# Patient Record
Sex: MALE | Race: WHITE | URBAN - METROPOLITAN AREA
[De-identification: names, ages, dates, MRNs, and addresses within clinical notes are randomized per-mention and may not be internally consistent; named-entity substitution may affect disease eponyms.]

---

## 2018-08-27 ENCOUNTER — OFFICE VISIT (OUTPATIENT)
Dept: PODIATRY | Facility: CLINIC | Age: 38
End: 2018-08-27
Payer: MEDICARE

## 2018-08-27 VITALS — WEIGHT: 211 LBS | BODY MASS INDEX: 41.43 KG/M2 | HEIGHT: 60 IN | HEART RATE: 84 BPM

## 2018-08-27 DIAGNOSIS — B35.1 ONYCHOMYCOSIS: ICD-10-CM

## 2018-08-27 DIAGNOSIS — L60.3 DYSTROPHIC NAIL: Primary | ICD-10-CM

## 2018-08-27 PROCEDURE — 99203 OFFICE O/P NEW LOW 30 MIN: CPT | Mod: 25 | Performed by: PODIATRIST

## 2018-08-27 PROCEDURE — 11720 DEBRIDE NAIL 1-5: CPT | Performed by: PODIATRIST

## 2018-08-27 RX ORDER — ALLOPURINOL 100 MG/1
TABLET ORAL
Refills: 3 | COMMUNITY
Start: 2018-04-17

## 2018-08-27 NOTE — PROGRESS NOTES
"Foot & Ankle Surgery  August 27, 2018    CC: \"curled or ingrown toenails\"    I was asked to see Tulio Johansen regarding the chief complaint by:  self    HPI:  Pt is a 38 year old male who presents with above complaint.  He presents with his sister.  Jeremiah had developmental disability and lives with his parents.  His nails have not been trimmed in some time.  His sister would like assistance in trimming his nails.      ROS:   Pos for CC.  The patient denies current nausea, vomiting, chills, fevers, belly pain, calf pain, chest pain or SOB.  Complete remainder of ROS is otherwise neg.    VITALS:    Vitals:    08/27/18 1429   Pulse: 84   Weight: 211 lb (95.7 kg)   Height: 5' (1.524 m)       PMH:  History reviewed. No pertinent past medical history.    SXHX:  History reviewed. No pertinent surgical history.     MEDS:    Current Outpatient Prescriptions   Medication     allopurinol (ZYLOPRIM) 100 MG tablet     No current facility-administered medications for this visit.        ALL:   No Known Allergies    FMH:  History reviewed. No pertinent family history.    SocHx:    Social History     Social History     Marital status: Single     Spouse name: N/A     Number of children: N/A     Years of education: N/A     Occupational History     Not on file.     Social History Main Topics     Smoking status: Never Smoker     Smokeless tobacco: Never Used     Alcohol use Not on file     Drug use: Not on file     Sexual activity: Not on file     Other Topics Concern     Not on file     Social History Narrative     No narrative on file           EXAMINATION:  Gen:   No apparent distress  Neuro:   A&Ox3, no deficits  Psych:    Answering questions appropriately for age and situation with normal affect  Head:    NCAT  Eye:    Visual scanning without deficit  Ear:    Response to auditory stimuli wnl  Lung:    Non-labored breathing on RA noted  Abd:    NTND per patient report  Lymph:    Neg for pitting/non-pitting edema BLE  Vasc:    Pulses " palpable, CFT minimally delayed  Neuro:    Light touch sensation intact to all sensory nerve distributions without paresthesias  Derm:    Nails 1-5 bilateral are thickened, curled/cylindrical.    MSK:    ROM, strength wnl without limitation, no pain on palpation noted.  Calf:    Neg for redness, swelling or tenderness    Assessment:  38 year old male with dystrophic thickened, cylindrical nails 1-5 bilateral       Plan:  Discussed etiologies, anatomy and options  1.  Dystrophic nails 1-5 bilateral   -nails trimmed in length and thickness with nail nipper  -nail care handout dispensed for future routine debridement  -we discussed nail fungus.  A few nails appear thickened and mycotic.  We discussed PO vs topical options and efficacy.  No treatment at this time    Follow up:  prn or sooner with acute issues      Patient's medical history was reviewed today    Body mass index is 41.21 kg/(m^2).  Weight management plan: Patient was referred to their PCP to discuss a diet and exercise plan.        Andrey Acevedo DPM FACFAS FACFAOM  Podiatric Foot & Ankle Surgeon  [facility]  [phone]

## 2018-08-27 NOTE — MR AVS SNAPSHOT
After Visit Summary   8/27/2018    Tulio Johansen    MRN: [id number]           Patient Information     Date Of Birth          1980        Visit Information        Provider Department      8/27/2018 2:30 PM Andrey Acevedo DPM [facility]        Care Instructions       Dr. Acevedo's Locations      [facility]   [address]    [address]  [place] [address]    [place] [address]    [phone]     Monday's     Tuesday afternoon's          ** WOUND PATIENTS ONLY**      [facility]  [address]     [address] [place] [address]   [place] [address]  [phone]  Wednesday's     Thursday's                  [facility]     [address]  [address]  [place] [address]    [facility] [place] [address]  [phone]  Friday morning's    Friday afternoon's       **Surgeries are done Tuesday morning's.         Surgery scheduler Tuyet: [phone]          FOOT CARE NURSES  If you are interested in having a foot care nurse come out to your   home, please call one of these contacts for more information:  [facility]  [phone] [facility]  [phone]   [facility]  [phone]  [place]/[facility] [phone]  [place]   [facility]  [phone]  At [place] & [facility] [phone]               Follow-ups after your visit        Who to contact     If you have questions or need follow up information about today's clinic visit or your schedule please contact [facility] directly at [phone].  Normal or non-critical lab and imaging results will be communicated to you by MyChart, letter or phone within 4 business days after the clinic has received the results. If you do not  "hear from us within 7 days, please contact the clinic through [employer] or phone. If you have a critical or abnormal lab result, we will notify you by phone as soon as possible.  Submit refill requests through [employer] or call your pharmacy and they will forward the refill request to us. Please allow 3 business days for your refill to be completed.          Additional Information About Your Visit        [employer] Information     [employer] lets you send messages to your doctor, view your test results, renew your prescriptions, schedule appointments and more. To sign up, go to [email or web address] . Click on \"Log in\" on the left side of the screen, which will take you to the Welcome page. Then click on \"Sign up Now\" on the right side of the page.     You will be asked to enter the access code listed below, as well as some personal information. Please follow the directions to create your username and password.     Your access code is: [email or web address]  Expires: 2018  2:42 PM     Your access code will  in 90 days. If you need help or a new code, please call your [facility] clinic or [phone].        Care EveryWhere ID     This is your Care EveryWhere ID. This could be used by other organizations to access your [facility] medical records  [id number]        Your Vitals Were     Pulse Height BMI (Body Mass Index)             84 5' (1.524 m) 41.21 kg/m2          Blood Pressure from Last 3 Encounters:   No data found for BP    Weight from Last 3 Encounters:   18 211 lb (95.7 kg)              Today, you had the following     No orders found for display       Primary Care Provider Fax #    Physician No Ref-Primary [phone]       No address on file        Equal Access to Services     [facility] : Hadii ryan Oquendo, farhat castillo, qadeborah kaalmada major, beth short . So [facility] [phone].    ATENCIÓN: Si habla español, tiene a aj disposición servicios gratuitos de " silas lingüísticcaroline. Ashley al [phone].    We comply with applicable federal civil rights laws and [place] laws. We do not discriminate on the basis of race, color, national origin, age, disability, sex, sexual orientation, or gender identity.            Thank you!     Thank you for choosing [facility]  for your care. Our goal is always to provide you with excellent care. Hearing back from our patients is one way we can continue to improve our services. Please take a few minutes to complete the written survey that you may receive in the mail after your visit with us. Thank you!             Your Updated Medication List - Protect others around you: Learn how to safely use, store and throw away your medicines at www.disposemymeds.org.          This list is accurate as of 8/27/18  2:42 PM.  Always use your most recent med list.                   Brand Name Dispense Instructions for use Diagnosis    allopurinol 100 MG tablet    ZYLOPRIM     TK 2 TS PO D

## 2018-08-27 NOTE — PATIENT INSTRUCTIONS
Dr. Acevedo's Locations      [facility]   [address]    [address]  [place] [address]    [place] [address]    [phone]     Monday's     Tuesday afternoon's          ** WOUND PATIENTS ONLY**      [facility]  [address]     [address] [address] [place] [address]   [place] [address]  [phone]  Wednesday's     Thursday's                  [facility]     [address]  [address]  [place] [address]    [facility] [address]  [phone]  Friday morning's    Friday afternoon's       **Surgeries are done Tuesday morning's.         Surgery scheduler Tuyet: [phone]          FOOT CARE NURSES  If you are interested in having a foot care nurse come out to your   home, please call one of these contacts for more information:  [facility]  [phone] [facility]  [phone]   [facility]  [phone]  [place]/[facility] [phone]  [place]   [facility]  [phone]  At [place] [facility] [phone]